# Patient Record
(demographics unavailable — no encounter records)

---

## 2024-10-31 NOTE — PHYSICAL EXAM
[de-identified] : Constitutional: Well-nourished, well-developed, No acute distress  Respiratory:  Good respiratory effort, no SOB  Psychiatric: Pleasant and normal affect, alert and oriented x3   Ankle: Skin: Clean dry and intact  Ankle: Moderate swelling ecchymosis of the anterolateral ankle and foot, no marked deformities or malalignment  Tenderness: ATFL, CFL, Lateral ankle  Nontender: medial malleolus, lateral malleolus, tibialis posterior tendon, achilles tendon, no marked thickening of tendon, PTFL, tibiofibular ligament/high ankle, sinus tarsus, deltoid ligaments, 5th metatarsal.   ROM: Mild limitation of PF and Inversion due to pain/swelling, NL DF and Eversion.   Resistive Testing: Mild pain with resisted eversion and DF.  painless resisted  plantar flexion, and inversion.   Special tests: + anterior drawer for pain without laxity, + talar tilt for pain without laxity 5/5 EHL TA GS SILT L3-S1 2+ dp pt wwp bcr  [de-identified] : I independently reviewed and interpreted the xrays of the ankle 9/24 - no fracture, no dislocation or OA.  No other acute osseous abnormalities (possible inondisplaced fibular frx)

## 2024-10-31 NOTE — HISTORY OF PRESENT ILLNESS
[de-identified] : Davidson is a 22yo F pw R ankle injury, hx of nondisplaced frx both as child and in last few years. She presents after they suffered a twisting injury and noted immediate pain and swelling.   Pt notes walking and stairs worsens the pain and symptoms.  Ambulation is still painful.  Has not trialed physical therapy or injections.  Pt denies numbness, paresthesias, f/c.  Would like to return to baseline high activity and would like to discuss treatment options. 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

## 2024-10-31 NOTE — DISCUSSION/SUMMARY
[de-identified] : 23yF pw atfl injury in setting of previous ankle injuries/weakness. The patient was extensively counseled on treatment options including but not limited to observation, rest/activity modification, bracing, anti-inflammatory medications, physical therapy, injections, and surgery.  The natural history of the disease was thoroughly explained.  We discussed that the majority of the time, this condition can be initially treated conservatively. The patient will proceed with: -PT -laceup ankle brace -pt was instructed on the importance of resting, icing and elevating to minimize swelling -RTC 4-6w, mri if persistent instability    I have personally obtained the history, reviewed the ROS as noted, and performed the physical examination today.  The patient and I discussed the assessment and options and developed the plan.  All questions were answered and the patient stated their understanding of the treatment plan and appreciation of the visit.   My cumulative time spent on this patient's visit included: Preparation for the visit, review of the medical records, review of pertinent diagnostic studies, examination and counseling of the patient on the above diagnosis, treatment plan and prognosis, orders of diagnostic tests, medications and/or appropriate procedures and documentation in the medical records of today's visit.   Hay Hsu MD

## 2024-10-31 NOTE — RETURN TO WORK/SCHOOL
[FreeTextEntry1] : Patient was seen and examined today for her right ankle. She may return to work with the limitations of being remote for work for 1 month. Please feel free to contact my office with any questions.  [FreeTextEntry2] : Dr. Hay Hsu